# Patient Record
Sex: FEMALE | Race: WHITE | URBAN - METROPOLITAN AREA
[De-identification: names, ages, dates, MRNs, and addresses within clinical notes are randomized per-mention and may not be internally consistent; named-entity substitution may affect disease eponyms.]

---

## 2018-07-09 ENCOUNTER — DOCTOR'S OFFICE (OUTPATIENT)
Dept: URBAN - METROPOLITAN AREA CLINIC 136 | Facility: CLINIC | Age: 63
Setting detail: OPHTHALMOLOGY
End: 2018-07-09
Payer: COMMERCIAL

## 2018-07-09 DIAGNOSIS — H52.4: ICD-10-CM

## 2018-07-09 DIAGNOSIS — H52.03: ICD-10-CM

## 2018-07-09 DIAGNOSIS — H52.223: ICD-10-CM

## 2018-07-09 PROCEDURE — 92014 COMPRE OPH EXAM EST PT 1/>: CPT | Performed by: OPTOMETRIST

## 2018-07-09 ASSESSMENT — REFRACTION_AUTOREFRACTION
OD_AXIS: 23
OS_SPHERE: +1.00
OD_SPHERE: +0.25
OS_CYLINDER: +0.25
OD_CYLINDER: +0.50
OS_AXIS: 14

## 2018-07-09 ASSESSMENT — REFRACTION_CURRENTRX
OD_OVR_VA: 20/
OD_SPHERE: +2.75
OS_OVR_VA: 20/
OS_OVR_VA: 20/
OD_OVR_VA: 20/
OD_AXIS: SPH
OD_OVR_VA: 20/
OS_AXIS: SPH
OS_OVR_VA: 20/
OS_SPHERE: +2.75

## 2018-07-09 ASSESSMENT — SPHEQUIV_DERIVED
OS_SPHEQUIV: 1.125
OD_SPHEQUIV: 0.5

## 2018-07-09 ASSESSMENT — REFRACTION_OUTSIDERX
OS_VA1: 20/20
OU_VA: 20/20
OS_AXIS: 105
OD_ADD: +2.50
OD_VA3: 20/
OD_VA2: 20/20
OS_VA2: 20/20
OS_ADD: +2.50
OS_VA3: 20/
OD_VA1: 20/20
OS_SPHERE: +1.25
OD_SPHERE: +0.50
OS_CYLINDER: -0.50
OD_CYLINDER: SPH

## 2018-07-09 ASSESSMENT — REFRACTION_MANIFEST
OS_VA3: 20/
OD_VA2: 20/
OD_VA1: 20/
OU_VA: 20/
OS_VA1: 20/
OU_VA: 20/
OD_VA3: 20/
OD_VA1: 20/
OS_VA2: 20/
OS_VA1: 20/
OD_VA3: 20/
OS_VA2: 20/
OS_VA3: 20/
OD_VA2: 20/

## 2018-07-09 ASSESSMENT — CONFRONTATIONAL VISUAL FIELD TEST (CVF)
OD_FINDINGS: FULL
OS_FINDINGS: FULL

## 2018-07-09 ASSESSMENT — VISUAL ACUITY
OD_BCVA: 20/40
OS_BCVA: 20/25+2

## 2019-07-30 ENCOUNTER — DOCTOR'S OFFICE (OUTPATIENT)
Dept: URBAN - METROPOLITAN AREA CLINIC 136 | Facility: CLINIC | Age: 64
Setting detail: OPHTHALMOLOGY
End: 2019-07-30
Payer: COMMERCIAL

## 2019-07-30 DIAGNOSIS — H52.4: ICD-10-CM

## 2019-07-30 DIAGNOSIS — H52.03: ICD-10-CM

## 2019-07-30 DIAGNOSIS — H52.223: ICD-10-CM

## 2019-07-30 PROBLEM — H25.13 NUCLEAR CATARACT OU ; BOTH EYES: Status: ACTIVE | Noted: 2018-07-09

## 2019-07-30 PROBLEM — H04.123 DRY EYE; BOTH EYES: Status: ACTIVE | Noted: 2019-07-30

## 2019-07-30 PROBLEM — E11.9 TYPE 2 DM WITHOUT RETINOPATHY ; BOTH EYES: Status: ACTIVE | Noted: 2018-07-09

## 2019-07-30 PROCEDURE — 92014 COMPRE OPH EXAM EST PT 1/>: CPT | Performed by: OPTOMETRIST

## 2019-07-30 ASSESSMENT — REFRACTION_CURRENTRX
OS_AXIS: SPH
OD_OVR_VA: 20/
OD_OVR_VA: 20/
OS_OVR_VA: 20/
OD_OVR_VA: 20/
OS_SPHERE: +2.75
OS_OVR_VA: 20/
OS_OVR_VA: 20/
OD_AXIS: SPH
OD_SPHERE: +2.75

## 2019-07-30 ASSESSMENT — KERATOMETRY
OS_AXISANGLE_DEGREES: 0
OS_K2POWER_DIOPTERS: 0.00
OD_K2POWER_DIOPTERS: 43.75
OD_AXISANGLE_DEGREES: 138
OS_K1POWER_DIOPTERS: 0.00
OD_K1POWER_DIOPTERS: 43.50

## 2019-07-30 ASSESSMENT — REFRACTION_MANIFEST
OS_SPHERE: +1.00
OD_VA2: 20/
OS_AXIS: 105
OD_CYLINDER: SPH
OS_VA3: 20/
OS_VA2: 20/20
OS_VA2: 20/
OS_CYLINDER: -0.50
OS_VA1: 20/
OD_VA1: 20/20
OD_VA2: 20/20
OD_VA1: 20/
OS_VA1: 20/20
OD_VA3: 20/
OS_ADD: +2.50
OU_VA: 20/
OD_VA3: 20/
OS_VA3: 20/
OD_ADD: +2.50
OD_SPHERE: +0.75
OU_VA: 20/20

## 2019-07-30 ASSESSMENT — CONFRONTATIONAL VISUAL FIELD TEST (CVF)
OD_FINDINGS: FULL
OS_FINDINGS: FULL

## 2019-07-30 ASSESSMENT — AXIALLENGTH_DERIVED: OD_AL: 23.3536

## 2019-07-30 ASSESSMENT — SPHEQUIV_DERIVED
OS_SPHEQUIV: 1
OS_SPHEQUIV: 0.75
OD_SPHEQUIV: 0.5

## 2019-07-30 ASSESSMENT — REFRACTION_AUTOREFRACTION
OS_AXIS: 0.00
OS_CYLINDER: 0.00
OD_CYLINDER: -0.50
OS_SPHERE: +1.00
OD_AXIS: 121
OD_SPHERE: +0.75

## 2019-07-30 ASSESSMENT — VISUAL ACUITY
OS_BCVA: 20/20-1
OD_BCVA: 20/25+3

## 2019-07-30 ASSESSMENT — DECREASING TEAR LAKE - SEVERITY SCORE
OS_DEC_TEARLAKE: 2+
OD_DEC_TEARLAKE: 2+

## 2021-04-08 DIAGNOSIS — Z23 ENCOUNTER FOR IMMUNIZATION: ICD-10-CM

## 2021-07-03 ENCOUNTER — HOSPITAL ENCOUNTER (EMERGENCY)
Facility: HOSPITAL | Age: 66
Discharge: HOME | End: 2021-07-03
Attending: EMERGENCY MEDICINE
Payer: COMMERCIAL

## 2021-07-03 VITALS
OXYGEN SATURATION: 98 % | SYSTOLIC BLOOD PRESSURE: 163 MMHG | RESPIRATION RATE: 16 BRPM | DIASTOLIC BLOOD PRESSURE: 78 MMHG | HEART RATE: 83 BPM | WEIGHT: 170.19 LBS | TEMPERATURE: 97.9 F

## 2021-07-03 DIAGNOSIS — R89.9 ABNORMAL LABORATORY TEST: Primary | ICD-10-CM

## 2021-07-03 LAB
ALBUMIN SERPL BCP-MCNC: 4.1 G/DL (ref 3.5–5)
ALP SERPL-CCNC: 58 U/L (ref 46–116)
ALT SERPL W P-5'-P-CCNC: 69 U/L (ref 12–78)
ANION GAP SERPL CALCULATED.3IONS-SCNC: 6 MMOL/L (ref 4–13)
AST SERPL W P-5'-P-CCNC: 38 U/L (ref 5–45)
BILIRUB SERPL-MCNC: 0.92 MG/DL (ref 0.2–1)
BUN SERPL-MCNC: 12 MG/DL (ref 5–25)
CALCIUM SERPL-MCNC: 9.1 MG/DL (ref 8.3–10.1)
CHLORIDE SERPL-SCNC: 104 MMOL/L (ref 100–108)
CO2 SERPL-SCNC: 30 MMOL/L (ref 21–32)
CREAT SERPL-MCNC: 0.84 MG/DL (ref 0.6–1.3)
GFR SERPL CREATININE-BSD FRML MDRD: 73 ML/MIN/1.73SQ M
GLUCOSE SERPL-MCNC: 100 MG/DL (ref 65–140)
POTASSIUM SERPL-SCNC: 4.8 MMOL/L (ref 3.5–5.3)
PROT SERPL-MCNC: 7.7 G/DL (ref 6.4–8.2)
SODIUM SERPL-SCNC: 140 MMOL/L (ref 136–145)

## 2021-07-03 PROCEDURE — 36415 COLL VENOUS BLD VENIPUNCTURE: CPT | Performed by: EMERGENCY MEDICINE

## 2021-07-03 PROCEDURE — 99282 EMERGENCY DEPT VISIT SF MDM: CPT | Performed by: EMERGENCY MEDICINE

## 2021-07-03 PROCEDURE — 80053 COMPREHEN METABOLIC PANEL: CPT | Performed by: EMERGENCY MEDICINE

## 2021-07-03 PROCEDURE — 99283 EMERGENCY DEPT VISIT LOW MDM: CPT

## 2021-07-03 NOTE — ED PROVIDER NOTES
History  Chief Complaint   Patient presents with    Abnormal Lab     Pt had blood work completed  Pt advised to have blood work redrawn as potassium was elevated and specimen slightly hemolyzed  70y F here for evaluation of elevated potassium level on lab work  Had it done yesterday afternoon for routine, yearly blood work for a physical   Pt w/o any specific complaints  Their daughter is an ED PA at [facility] and when the pt asked her about her labs and noted to have an elevated potassium level, their daughter told her to come to have it repeated  Denies f/c/s, no ha, no cough/congestion, no cp/pressure, no palpitations, no abd pain, appetite okay, no n/v, no changes in urination, no le edema  No complaints - just wanted labs re-checked  History provided by:  Patient and medical records   used: No    Evaluation of Abnormal Diagnostic Test  Time since result:  1 day  Patient referred by:  PCP and ED personnel  Resulting agency:  [facility]  Result type: chemistry    Chemistry:     Potassium:  High      None       Past Medical History:   Diagnosis Date    Breast CA ([facility] [address] )     Right    High cholesterol     Hypertension     Seasonal allergies        Past Surgical History:   Procedure Laterality Date    BREAST LUMPECTOMY Right 2014       History reviewed  No pertinent family history  I have reviewed and agree with the history as documented  E-Cigarette/Vaping     E-Cigarette/Vaping Substances     Social History     Tobacco Use    Smoking status: Never Smoker    Smokeless tobacco: Never Used   Substance Use Topics    Alcohol use: Yes     Comment: Socail    Drug use: Never       Review of Systems   Constitutional: Negative for activity change, chills and fever  Respiratory: Negative for chest tightness and shortness of breath  Cardiovascular: Negative for chest pain and palpitations  Gastrointestinal: Negative for abdominal pain, nausea and vomiting     Genitourinary: Negative for dysuria and frequency  Neurological: Negative for weakness and numbness  All other systems reviewed and are negative  Physical Exam  Physical Exam  Vitals and nursing note reviewed  Constitutional:       Appearance: Normal appearance  Eyes:      Conjunctiva/sclera: Conjunctivae normal    Cardiovascular:      Rate and Rhythm: Normal rate  Pulmonary:      Effort: Pulmonary effort is normal    Abdominal:      General: There is no distension  Musculoskeletal:         General: No swelling  Cervical back: Normal range of motion  Skin:     General: Skin is warm  Neurological:      General: No focal deficit present  Mental Status: She is alert and oriented to person, place, and time     Psychiatric:         Mood and Affect: Mood normal          Vital Signs  ED Triage Vitals [07/03/21 1539]   Temperature Pulse Respirations Blood Pressure SpO2   97 9 °F (36 6 °C) 83 16 163/78 98 %      Temp Source Heart Rate Source Patient Position - Orthostatic VS BP Location FiO2 (%)   Oral -- Sitting Right arm --      Pain Score       No Pain           Vitals:    07/03/21 1539   BP: 163/78   Pulse: 83   Patient Position - Orthostatic VS: Sitting         Visual Acuity      ED Medications  Medications - No data to display    Diagnostic Studies  Results Reviewed     Procedure Component Value Units Date/Time    Comprehensive metabolic panel [[id number]] Collected: 07/03/21 1639    Lab Status: Final result Specimen: Blood from Hand, Right Updated: 07/03/21 1702     Sodium 140 mmol/L      Potassium 4 8 mmol/L      Chloride 104 mmol/L      CO2 30 mmol/L      ANION GAP 6 mmol/L      BUN 12 mg/dL      Creatinine 0 84 mg/dL      Glucose 100 mg/dL      Calcium 9 1 mg/dL      AST 38 U/L      ALT 69 U/L      Alkaline Phosphatase 58 U/L      Total Protein 7 7 g/dL      Albumin 4 1 g/dL      Total Bilirubin 0 92 mg/dL      eGFR 73 ml/min/1 73sq m     Narrative:      [place] guidelines for Chronic Kidney Disease (CKD):     Stage 1 with normal or high GFR (GFR > 90 mL/min/1 73 square meters)    Stage 2 Mild CKD (GFR = 60-89 mL/min/1 73 square meters)    Stage 3A Moderate CKD (GFR = 45-59 mL/min/1 73 square meters)    Stage 3B Moderate CKD (GFR = 30-44 mL/min/1 73 square meters)    Stage 4 Severe CKD (GFR = 15-29 mL/min/1 73 square meters)    Stage 5 End Stage CKD (GFR <15 mL/min/1 73 square meters)  Note: GFR calculation is accurate only with a steady state creatinine                 No orders to display              Procedures  Procedures         ED Course  ED Course as of Jul 03 2149   Sat Jul 03, 2021   1729 Will    Potassium: 4 8                                           MDM  Number of Diagnoses or Management Options  Abnormal laboratory test: new and requires workup     Amount and/or Complexity of Data Reviewed  Tests in the radiology section of CPT®: ordered and reviewed        Disposition  Final diagnoses:   Abnormal laboratory test     Time reflects when diagnosis was documented in both MDM as applicable and the Disposition within this note     Time User Action Codes Description Comment    7/3/2021  5:32 PM Suyapa Ding Add [R89 9] Abnormal laboratory test       ED Disposition     ED Disposition Condition Date/Time Comment    Discharge Stable Sat Jul 3, 2021  5:31 PM Angie Keane discharge to home/self care  Follow-up Information     Follow up With Specialties Details Why Contact Info    Jalyn Begum MD Family Medicine  As needed [address] [place] [address] So   [facility] [address] [facility]  [phone]            There are no discharge medications for this patient  No discharge procedures on file      PDMP Review     None          ED Provider  Electronically Signed by           Peace Richardson DO  07/03/21 4361

## 2021-07-03 NOTE — DISCHARGE INSTRUCTIONS
The re-check of your potassium level was normal today    Follow up with your primary care provider as previously directed